# Patient Record
Sex: FEMALE | Race: BLACK OR AFRICAN AMERICAN | HISPANIC OR LATINO | Employment: UNEMPLOYED | URBAN - METROPOLITAN AREA
[De-identification: names, ages, dates, MRNs, and addresses within clinical notes are randomized per-mention and may not be internally consistent; named-entity substitution may affect disease eponyms.]

---

## 2022-05-18 ENCOUNTER — OFFICE VISIT (OUTPATIENT)
Dept: FAMILY MEDICINE CLINIC | Facility: CLINIC | Age: 27
End: 2022-05-18

## 2022-05-18 VITALS
DIASTOLIC BLOOD PRESSURE: 75 MMHG | BODY MASS INDEX: 31.42 KG/M2 | HEART RATE: 105 BPM | SYSTOLIC BLOOD PRESSURE: 122 MMHG | RESPIRATION RATE: 17 BRPM | OXYGEN SATURATION: 95 % | HEIGHT: 65 IN | WEIGHT: 188.6 LBS | TEMPERATURE: 98 F

## 2022-05-18 DIAGNOSIS — Z00.00 WELLNESS EXAMINATION: ICD-10-CM

## 2022-05-18 DIAGNOSIS — H53.8 BLURRY VISION, LEFT EYE: ICD-10-CM

## 2022-05-18 DIAGNOSIS — R04.0 EPISTAXIS: Primary | ICD-10-CM

## 2022-05-18 LAB
BILIRUB BLD-MCNC: NEGATIVE MG/DL
CLARITY, POC: CLEAR
COLOR UR: YELLOW
EXPIRATION DATE: NORMAL
GLUCOSE UR STRIP-MCNC: NEGATIVE MG/DL
KETONES UR QL: NEGATIVE
LEUKOCYTE EST, POC: NEGATIVE
Lab: NORMAL
NITRITE UR-MCNC: NEGATIVE MG/ML
PH UR: 6 [PH] (ref 5–8)
PROT UR STRIP-MCNC: NEGATIVE MG/DL
RBC # UR STRIP: NEGATIVE /UL
SP GR UR: 1.02 (ref 1–1.03)
UROBILINOGEN UR QL: NORMAL

## 2022-05-18 PROCEDURE — 99203 OFFICE O/P NEW LOW 30 MIN: CPT | Performed by: FAMILY MEDICINE

## 2022-05-18 NOTE — PROGRESS NOTES
"Chief Complaint  Nose Bleed (X 5 days)    Subjective          Hannah Nice presents to [facility]  History of Present Illness  Bilateral nose bleeding x 5 days,no congestion, mild, no sneezing, no smoker + COVID vaccine, on/off, also blurry vision x a while   Objective   Vital Signs:  /75 (BP Location: Left arm, Patient Position: Sitting, Cuff Size: Large Adult)   Pulse 105   Temp 98 °F (36.7 °C) (Infrared)   Resp 17   Ht 165 cm (64.96\")   Wt 85.5 kg (188 lb 9.6 oz)   SpO2 95%   BMI 31.42 kg/m²         Physical Exam  Vitals and nursing note reviewed.   Constitutional:       General: She is not in acute distress.     Appearance: Normal appearance. She is well-developed. She is not ill-appearing, toxic-appearing or diaphoretic.   HENT:      Head: Normocephalic and atraumatic.      Right Ear: Tympanic membrane, ear canal and external ear normal. There is no impacted cerumen.      Left Ear: Tympanic membrane, ear canal and external ear normal. There is no impacted cerumen.      Nose: Nose normal. No congestion or rhinorrhea.      Comments: Swollen turbinates bilateral     Mouth/Throat:      Mouth: Mucous membranes are moist.      Pharynx: Oropharynx is clear. No oropharyngeal exudate or posterior oropharyngeal erythema.   Eyes:      General: No scleral icterus.        Right eye: No discharge.         Left eye: No discharge.      Extraocular Movements: Extraocular movements intact.      Conjunctiva/sclera: Conjunctivae normal.      Pupils: Pupils are equal, round, and reactive to light.   Neck:      Thyroid: No thyromegaly.      Vascular: No carotid bruit or JVD.      Trachea: No tracheal deviation.   Cardiovascular:      Rate and Rhythm: Normal rate and regular rhythm.      Heart sounds: Normal heart sounds. No murmur heard.    No friction rub. No gallop.   Pulmonary:      Effort: Pulmonary effort is normal. No respiratory distress.      Breath sounds: Normal breath sounds. " No stridor. No wheezing, rhonchi or rales.   Chest:      Chest wall: No tenderness.   Abdominal:      General: Bowel sounds are normal. There is no distension.      Palpations: Abdomen is soft. There is no mass.      Tenderness: There is no abdominal tenderness. There is no right CVA tenderness, left CVA tenderness, guarding or rebound.      Hernia: No hernia is present.   Musculoskeletal:         General: Normal range of motion.      Cervical back: Normal range of motion and neck supple. No rigidity or tenderness.      Right lower leg: No edema.      Left lower leg: No edema.   Lymphadenopathy:      Cervical: No cervical adenopathy.   Skin:     General: Skin is warm and dry.      Coloration: Skin is not jaundiced.   Neurological:      General: No focal deficit present.      Mental Status: She is alert and oriented to person, place, and time. Mental status is at baseline.      Sensory: No sensory deficit.      Motor: No weakness or abnormal muscle tone.      Coordination: Coordination normal.      Gait: Gait normal.      Deep Tendon Reflexes: Reflexes normal.   Psychiatric:         Mood and Affect: Mood normal.         Behavior: Behavior normal.         Thought Content: Thought content normal.         Judgment: Judgment normal.        Result Review :                 Assessment and Plan    Diagnoses and all orders for this visit:    1. Epistaxis (Primary)  -     Ambulatory Referral to ENT (Otolaryngology)    2. Wellness examination  -     CBC & Differential  -     Comprehensive Metabolic Panel  -     Lipid Panel  -     TSH  -     POC Urinalysis Dipstick, Automated             Follow Up   No follow-ups on file.  Patient was given instructions and counseling regarding her condition or for health maintenance advice. Please see specific information pulled into the AVS if appropriate.

## 2022-05-19 ENCOUNTER — TELEPHONE (OUTPATIENT)
Dept: FAMILY MEDICINE CLINIC | Facility: CLINIC | Age: 27
End: 2022-05-19

## 2022-05-19 LAB
ALBUMIN SERPL-MCNC: 4.8 G/DL (ref 3.9–5)
ALBUMIN/GLOB SERPL: 1.8 {RATIO} (ref 1.2–2.2)
ALP SERPL-CCNC: 79 IU/L (ref 44–121)
ALT SERPL-CCNC: 16 IU/L (ref 0–32)
AST SERPL-CCNC: 17 IU/L (ref 0–40)
BASOPHILS # BLD AUTO: 0 X10E3/UL (ref 0–0.2)
BASOPHILS NFR BLD AUTO: 1 %
BILIRUB SERPL-MCNC: 0.4 MG/DL (ref 0–1.2)
BUN SERPL-MCNC: 10 MG/DL (ref 6–20)
BUN/CREAT SERPL: 19 (ref 9–23)
CALCIUM SERPL-MCNC: 9.2 MG/DL (ref 8.7–10.2)
CHLORIDE SERPL-SCNC: 103 MMOL/L (ref 96–106)
CHOLEST SERPL-MCNC: 180 MG/DL (ref 100–199)
CO2 SERPL-SCNC: 22 MMOL/L (ref 20–29)
CREAT SERPL-MCNC: 0.53 MG/DL (ref 0.57–1)
EGFRCR SERPLBLD CKD-EPI 2021: 130 ML/MIN/1.73
EOSINOPHIL # BLD AUTO: 0.1 X10E3/UL (ref 0–0.4)
EOSINOPHIL NFR BLD AUTO: 1 %
ERYTHROCYTE [DISTWIDTH] IN BLOOD BY AUTOMATED COUNT: 12.1 % (ref 11.7–15.4)
GLOBULIN SER CALC-MCNC: 2.7 G/DL (ref 1.5–4.5)
GLUCOSE SERPL-MCNC: 85 MG/DL (ref 65–99)
HCT VFR BLD AUTO: 43.8 % (ref 34–46.6)
HDLC SERPL-MCNC: 51 MG/DL
HGB BLD-MCNC: 14.7 G/DL (ref 11.1–15.9)
IMM GRANULOCYTES # BLD AUTO: 0 X10E3/UL (ref 0–0.1)
IMM GRANULOCYTES NFR BLD AUTO: 0 %
LDLC SERPL CALC-MCNC: 120 MG/DL (ref 0–99)
LYMPHOCYTES # BLD AUTO: 1.8 X10E3/UL (ref 0.7–3.1)
LYMPHOCYTES NFR BLD AUTO: 26 %
MCH RBC QN AUTO: 29.3 PG (ref 26.6–33)
MCHC RBC AUTO-ENTMCNC: 33.6 G/DL (ref 31.5–35.7)
MCV RBC AUTO: 87 FL (ref 79–97)
MONOCYTES # BLD AUTO: 0.5 X10E3/UL (ref 0.1–0.9)
MONOCYTES NFR BLD AUTO: 7 %
NEUTROPHILS # BLD AUTO: 4.5 X10E3/UL (ref 1.4–7)
NEUTROPHILS NFR BLD AUTO: 65 %
PLATELET # BLD AUTO: 248 X10E3/UL (ref 150–450)
POTASSIUM SERPL-SCNC: 4.4 MMOL/L (ref 3.5–5.2)
PROT SERPL-MCNC: 7.5 G/DL (ref 6–8.5)
RBC # BLD AUTO: 5.01 X10E6/UL (ref 3.77–5.28)
SODIUM SERPL-SCNC: 139 MMOL/L (ref 134–144)
TRIGL SERPL-MCNC: 48 MG/DL (ref 0–149)
TSH SERPL DL<=0.005 MIU/L-ACNC: 1.04 UIU/ML (ref 0.45–4.5)
VLDLC SERPL CALC-MCNC: 9 MG/DL (ref 5–40)
WBC # BLD AUTO: 7 X10E3/UL (ref 3.4–10.8)

## 2022-05-19 NOTE — TELEPHONE ENCOUNTER
Spoke with  (ID# [id number]).    LMTCB    OK for HUB to read.    LDL cholesterol slightly elevated, please mail cholesterol diet and recheck in 6 months, rest of the labs are normal.      Mailed out cholesterol diet sheet.

## 2022-05-19 NOTE — TELEPHONE ENCOUNTER
Caller: Hannah Nice    Relationship: Self    Best call back number: [phone]    PATIENT CALLED AND HUB READ RESULTS TO PATIENT THROUGH THE  SERVICE. PATIENT VERBALIZED UNDERSTANDING.

## 2022-05-19 NOTE — TELEPHONE ENCOUNTER
----- Message from Alfonso Guerra MD sent at 5/19/2022  7:15 AM EDT -----  Please call the patient regarding her abnormal result.  LDL cholesterol slightly elevated, please mail cholesterol diet and recheck in 6 months, rest of the labs are normal

## 2022-05-21 ENCOUNTER — TELEPHONE (OUTPATIENT)
Dept: FAMILY MEDICINE CLINIC | Facility: CLINIC | Age: 27
End: 2022-05-21

## 2022-05-21 DIAGNOSIS — R11.0 NAUSEA: Primary | ICD-10-CM

## 2022-05-21 RX ORDER — PROMETHAZINE HYDROCHLORIDE 25 MG/1
25 TABLET ORAL EVERY 8 HOURS PRN
Qty: 30 TABLET | Refills: 1 | Status: SHIPPED | OUTPATIENT
Start: 2022-05-21

## 2022-07-13 ENCOUNTER — TELEPHONE (OUTPATIENT)
Dept: FAMILY MEDICINE CLINIC | Facility: CLINIC | Age: 27
End: 2022-07-13

## 2022-07-13 NOTE — TELEPHONE ENCOUNTER
The patient called today asking for Dr. Lou to call her back. She said the doctor called her twice yesterday. She is requesting a call back today. Please advise [phone]. A  is needed.

## 2022-07-27 ENCOUNTER — TELEPHONE (OUTPATIENT)
Dept: FAMILY MEDICINE CLINIC | Facility: CLINIC | Age: 27
End: 2022-07-27

## 2022-07-27 NOTE — TELEPHONE ENCOUNTER
Caller: Hannah Nice    Relationship: Self    Best call back number: [phone]     What is the best time to reach you: ANYTIME     Who are you requesting to speak with (clinical staff, provider,  specific staff member): CLINICAL STAFF       What was the call regarding: PATIENT IS WANTING TO GET CYTOLOGY  TESTING DONE. PATIENT IS WANTING TO KNOW HOW SHE NEEDS TO GO ABOUT GETTING THIS DONE.     PLEASE ADVISE     Do you require a callback: YES

## 2022-07-27 NOTE — TELEPHONE ENCOUNTER
Spoke with  (id# [id number]).    LMTCB    OK for HUB to read.    What type of cytology test are needing? Is it a PAP Smear?

## 2022-08-03 DIAGNOSIS — Z01.419 WELL FEMALE EXAM WITH ROUTINE GYNECOLOGICAL EXAM: Primary | ICD-10-CM

## 2022-09-21 ENCOUNTER — OFFICE VISIT (OUTPATIENT)
Dept: OBSTETRICS AND GYNECOLOGY | Facility: CLINIC | Age: 27
End: 2022-09-21

## 2022-09-21 VITALS
SYSTOLIC BLOOD PRESSURE: 120 MMHG | WEIGHT: 185.6 LBS | DIASTOLIC BLOOD PRESSURE: 68 MMHG | HEIGHT: 65 IN | BODY MASS INDEX: 30.92 KG/M2

## 2022-09-21 DIAGNOSIS — Z01.419 ENCOUNTER FOR GYNECOLOGICAL EXAMINATION WITHOUT ABNORMAL FINDING: Primary | ICD-10-CM

## 2022-09-21 PROCEDURE — 2014F MENTAL STATUS ASSESS: CPT | Performed by: OBSTETRICS & GYNECOLOGY

## 2022-09-21 PROCEDURE — 99385 PREV VISIT NEW AGE 18-39: CPT | Performed by: OBSTETRICS & GYNECOLOGY

## 2022-09-21 NOTE — PROGRESS NOTES
"GYN Annual Exam     CC- Here for annual exam.     Hannah Nice is a 26 y.o. female who presents for annual well woman exam. Periods are regular every 28-30 days, lasting a few days. Dysmenorrhea:none. Cyclic symptoms include none. No intermenstrual bleeding, spotting, or discharge.    OB History        3    Para   1    Term                AB   2    Living   1       SAB        IAB        Ectopic        Molar        Multiple        Live Births   1                Current contraception: Nexplanon, since   History of abnormal Pap smear: no  Family history of uterine, colon or ovarian cancer: no  History of abnormal mammogram: no  Family history of breast cancer: yes - On her paternal side  Last Pap :     History reviewed. No pertinent past medical history.    History reviewed. No pertinent surgical history.      Current Outpatient Medications:   •  promethazine (PHENERGAN) 25 MG tablet, Take 1 tablet by mouth Every 8 (Eight) Hours As Needed for Nausea or Vomiting. (Patient taking differently: Take 25 mg by mouth Every 8 (Eight) Hours As Needed for Nausea or Vomiting. prn), Disp: 30 tablet, Rfl: 1    Allergies   Allergen Reactions   • Penicillins Rash       Social History     Tobacco Use   • Smoking status: Never Smoker   • Smokeless tobacco: Never Used   Vaping Use   • Vaping Use: Never used   Substance Use Topics   • Alcohol use: Yes     Comment: socially   • Drug use: Never       Family History   Problem Relation Age of Onset   • Breast cancer Paternal Grandmother        Review of Systems   Constitutional: Negative for fatigue.   Respiratory: Negative for cough and shortness of breath.    Genitourinary: Negative for menstrual problem and pelvic pain.       /68   Ht 165 cm (64.96\")   Wt 84.2 kg (185 lb 9.6 oz)   LMP 2022 Comment: Nexplaon  BMI 30.92 kg/m²     Physical Exam  Genitourinary:      Bladder and urethral meatus normal.      No lesions in the vagina.      Right Labia: No " lesions.     Left Labia: No lesions.     No vaginal discharge or bleeding.      No vaginal prolapse present.     No vaginal atrophy present.       Right Adnexa: not tender, not full and no mass present.     Left Adnexa: not tender, not full and no mass present.     No cervical motion tenderness, discharge or lesion.      Uterus is not enlarged, fixed or tender.      No uterine mass detected.     Uterus is midaxial.   Breasts:      Right: No mass, nipple discharge, skin change or tenderness.      Left: No mass, nipple discharge, skin change or tenderness.       Neck:      Thyroid: No thyroid mass or thyromegaly.   Abdominal:      General: Abdomen is flat.      Palpations: Abdomen is soft. There is no mass.      Tenderness: There is no abdominal tenderness.   Neurological:      Mental Status: She is alert.   Vitals reviewed.     Nexplanon palpated in the left upper arm.         Assessment     1) GYN annual well woman exam.   2) contraception management.  Patient will come back next year for Nexplanon removal and replacement if she wants to proceed or continue that method.     Plan     1) Breast Health - Clinical breast exam yearly, Discussed [employer] recommendations for breast cancer screening, and Self breast awareness monthly  2) Pap -done today with high risk HPV  3) Smoking status-negative  4) Encouraged to be wary of information obtained via social media and internet based on source and search.  5) Follow up prn and one year.       Santiago Napier MD   9/21/2022  13:45 EDT

## 2022-09-27 LAB
CONV .: NORMAL
CYTOLOGIST CVX/VAG CYTO: NORMAL
CYTOLOGY CVX/VAG DOC CYTO: NORMAL
CYTOLOGY CVX/VAG DOC THIN PREP: NORMAL
DX ICD CODE: NORMAL
HIV 1 & 2 AB SER-IMP: NORMAL
OTHER STN SPEC: NORMAL
STAT OF ADQ CVX/VAG CYTO-IMP: NORMAL